# Patient Record
Sex: FEMALE | Race: WHITE | NOT HISPANIC OR LATINO | Employment: FULL TIME | URBAN - METROPOLITAN AREA
[De-identification: names, ages, dates, MRNs, and addresses within clinical notes are randomized per-mention and may not be internally consistent; named-entity substitution may affect disease eponyms.]

---

## 2023-03-20 ENCOUNTER — TELEPHONE (OUTPATIENT)
Dept: PREADMISSION TESTING | Facility: HOSPITAL | Age: 48
End: 2023-03-20
Payer: COMMERCIAL

## 2023-03-28 ENCOUNTER — HOSPITAL ENCOUNTER (OUTPATIENT)
Dept: PREADMISSION TESTING | Facility: HOSPITAL | Age: 48
Discharge: HOME | End: 2023-03-28
Attending: INTERNAL MEDICINE
Payer: COMMERCIAL

## 2023-03-28 DIAGNOSIS — Z12.11 SCREENING FOR COLON CANCER: Primary | ICD-10-CM

## 2023-03-28 RX ORDER — SODIUM, POTASSIUM,MAG SULFATES 17.5-3.13G
1 SOLUTION, RECONSTITUTED, ORAL ORAL DAILY
Qty: 1 KIT | Refills: 0 | Status: SHIPPED | OUTPATIENT
Start: 2023-03-28 | End: 2023-03-30

## 2023-04-14 ENCOUNTER — TELEPHONE (OUTPATIENT)
Dept: PREADMISSION TESTING | Facility: HOSPITAL | Age: 48
End: 2023-04-14
Payer: COMMERCIAL

## 2024-05-28 ENCOUNTER — TELEPHONE (OUTPATIENT)
Dept: NEUROLOGY | Facility: CLINIC | Age: 49
End: 2024-05-28
Payer: COMMERCIAL

## 2024-05-28 ENCOUNTER — OFFICE VISIT (OUTPATIENT)
Dept: NEUROLOGY | Facility: CLINIC | Age: 49
End: 2024-05-28
Payer: COMMERCIAL

## 2024-05-28 ENCOUNTER — TELEPHONE (OUTPATIENT)
Dept: OPHTHALMOLOGY | Facility: CLINIC | Age: 49
End: 2024-05-28
Payer: COMMERCIAL

## 2024-05-28 VITALS
SYSTOLIC BLOOD PRESSURE: 189 MMHG | WEIGHT: 142.19 LBS | BODY MASS INDEX: 24.28 KG/M2 | OXYGEN SATURATION: 99 % | RESPIRATION RATE: 16 BRPM | DIASTOLIC BLOOD PRESSURE: 98 MMHG | HEIGHT: 64 IN | HEART RATE: 65 BPM

## 2024-05-28 DIAGNOSIS — H53.8 BLURRED VISION, LEFT EYE: ICD-10-CM

## 2024-05-28 DIAGNOSIS — R40.20 LOSS OF CONSCIOUSNESS: Primary | ICD-10-CM

## 2024-05-28 PROCEDURE — 3008F BODY MASS INDEX DOCD: CPT | Mod: CPTII,S$GLB,, | Performed by: PSYCHIATRY & NEUROLOGY

## 2024-05-28 PROCEDURE — 99999 PR PBB SHADOW E&M-EST. PATIENT-LVL V: CPT | Mod: PBBFAC,,, | Performed by: PSYCHIATRY & NEUROLOGY

## 2024-05-28 PROCEDURE — 3080F DIAST BP >= 90 MM HG: CPT | Mod: CPTII,S$GLB,, | Performed by: PSYCHIATRY & NEUROLOGY

## 2024-05-28 PROCEDURE — 1159F MED LIST DOCD IN RCRD: CPT | Mod: CPTII,S$GLB,, | Performed by: PSYCHIATRY & NEUROLOGY

## 2024-05-28 PROCEDURE — 4010F ACE/ARB THERAPY RXD/TAKEN: CPT | Mod: CPTII,S$GLB,, | Performed by: PSYCHIATRY & NEUROLOGY

## 2024-05-28 PROCEDURE — 1160F RVW MEDS BY RX/DR IN RCRD: CPT | Mod: CPTII,S$GLB,, | Performed by: PSYCHIATRY & NEUROLOGY

## 2024-05-28 PROCEDURE — 3077F SYST BP >= 140 MM HG: CPT | Mod: CPTII,S$GLB,, | Performed by: PSYCHIATRY & NEUROLOGY

## 2024-05-28 PROCEDURE — 99205 OFFICE O/P NEW HI 60 MIN: CPT | Mod: S$GLB,,, | Performed by: PSYCHIATRY & NEUROLOGY

## 2024-05-28 RX ORDER — ATENOLOL 25 MG/1
25 TABLET ORAL
COMMUNITY
Start: 2024-03-19 | End: 2024-09-15

## 2024-05-28 RX ORDER — DEXTROAMPHETAMINE SACCHARATE, AMPHETAMINE ASPARTATE MONOHYDRATE, DEXTROAMPHETAMINE SULFATE AND AMPHETAMINE SULFATE 7.5; 7.5; 7.5; 7.5 MG/1; MG/1; MG/1; MG/1
CAPSULE, EXTENDED RELEASE ORAL EVERY MORNING
COMMUNITY

## 2024-05-28 RX ORDER — SERTRALINE HYDROCHLORIDE 50 MG/1
50 TABLET, FILM COATED ORAL EVERY MORNING
COMMUNITY

## 2024-05-28 RX ORDER — OLMESARTAN MEDOXOMIL 20 MG/1
1 TABLET ORAL EVERY MORNING
COMMUNITY
Start: 2024-03-19

## 2024-05-28 NOTE — PROGRESS NOTES
Subjective     Patient ID: Ayanna Olivo is a 49 y.o. female.    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 Pt had 2 witnessed syncopal episodes with diaphoresis while there from a seated position. Did not take BP medications this AM denies SOB/CP received 500NS bolus, reports feels slightly better CBG 88         Patient is a well-appearing 48-year-old female with a history of hypertension who is brought to the ED via ambulance due to syncope.  Patient states she was at a eye doctor appointment and had her eyes dilated was being told she may need an MRI of something in her eye when she began getting anxious and then felt like she was in a pass out.  She laid down on the ground but then went out for several minutes her mother reports.  She states she came back around but seemed confused and then passed out for another few seconds.  At this time patient's she just feels tired.  Patient mother states the patient did seem to shake a little bit when she went unconscious.  Patient does not appear to have had any postictal symptoms according to EMS.  No loss of bladder control.  Patient denies any headache or chest pain.  No shortness of breath.  Patient goes on reports she has had these issues off and on for several months now.  She  states it seems to happen whenever she is under emotional distress or anxious about something.  Patient goes on to report that she was at the eye doctor getting evaluated for a partial loss of vision to the lower visual field in the left eye.  This has happened slowly over the last several months.  She states she is feeling much better now following some IV fluids and route.           Vitals:     05/20/24 1501 05/20/24 1533 05/20/24 1600 05/20/24 1631   BP: (!) 186/99 (!) 163/91 (!) 168/83 (!) 151/80   BP Location: Left arm         Patient Position: Standing         Pulse: 72 63 62 64   Resp: 16 16 20 17   Temp:           TempSrc:           SpO2:   99% 99% 99%   Weight:           Height:                Medication List with Changes/Refills   Current Medications    ATENOLOL (TENORMIN) 25 MG TABLET    Take 25 mg by mouth.       Start Date: 3/19/2024 End Date: 9/15/2024             DEXTROAMPHETAMINE-AMPHETAMINE (ADDERALL XR) 30 MG 24 HR CAPSULE    Take by mouth every morning.       Start Date: --        End Date: --                      OLMESARTAN (BENICAR) 20 MG TABLET    Take 1 tablet by mouth every morning.       Start Date: 3/19/2024 End Date: --    SERTRALINE (ZOLOFT) 50 MG TABLET    Take 50 mg by mouth every morning.       Start Date: --        End Date: --   Discontinued Medications            Start Date: 9/22/2022 End Date: 5/28/2024    DEXTROAMPHETAMINE-AMPHETAMINE 10 MG TAB    Adderall Take No date recorded No form recorded No frequency recorded No route recorded No set duration recorded No set duration amount recorded active No dosage strength recorded No dosage strength units of measure recorded       Start Date: --        End Date: 5/28/2024     Smokes, vaping                 Review of Systems   Constitutional: Negative.    HENT: Negative.     Eyes:  Positive for visual disturbance.   Respiratory: Negative.     Cardiovascular: Negative.    Gastrointestinal: Negative.    Endocrine: Negative.     Genitourinary: Negative.    Musculoskeletal: Negative.    Integumentary:  Negative.   Allergic/Immunologic: Negative.    Neurological: Negative.    Hematological: Negative.    Psychiatric/Behavioral: Negative.            Objective     Physical Exam  Constitutional:       Appearance: Normal appearance.   HENT:      Head: Normocephalic and atraumatic.      Right Ear: External ear normal.      Left Ear: External ear normal.      Nose: Nose normal.      Mouth/Throat:      Mouth: Mucous membranes are moist.   Eyes:      Extraocular Movements: Extraocular movements intact.      Conjunctiva/sclera: Conjunctivae normal.      Pupils: Pupils are equal, round, and reactive to light.   Cardiovascular:      Rate and Rhythm: Normal rate.      Pulses: Normal pulses.   Pulmonary:      Effort: Pulmonary effort is normal.   Musculoskeletal:         General: Normal range of motion.   Skin:     General: Skin is warm.   Neurological:      General: No focal deficit present.      Mental Status: She is alert and oriented to person, place, and time.      Cranial Nerves: No cranial nerve deficit.      Sensory: No sensory deficit.      Motor: No weakness.   Psychiatric:         Mood and Affect: Mood normal.         Behavior: Behavior normal.         Thought Content: Thought content normal.         Judgment: Judgment normal.            Assessment and Plan   I reviewed MRI - unremarkable     AION vs ON (smoking)   1. Loss of consciousness  -     EEG,w/awake & asleep record; Future    2. Blurred vision, left eye  -     Ambulatory referral/consult to Ophthalmology; Future; Expected date: 06/04/2024      EEG   See cardiologist   Discussed management of vasovagal syncope   See counselor   See Neuro-ophthal      Patient education:   We discussed occupational risks and hazards, driving restrictions and limitations, and general safety in patients with epilepsy and patients with episodes of loss of consciousness from any etiology. I specifically  pointed out how the patient can put herself and others at serious risks (leading to death and/or injury) if she has a seizure (or episode of loss of consciousness)  while driving. Patient verbalized understanding. I requested that the patient meet with DMV to discuss protocol for driving privileges in patients with such disorders.          No follow-ups on file.

## 2024-05-28 NOTE — TELEPHONE ENCOUNTER
----- Message from Damion Pearson sent at 5/28/2024  4:04 PM CDT -----  Regarding: PT Advice  Contact: [phone]  Pt calling to speak with Dr. Yoo regarding Dr Davenport. States next available appt  is  Dec. Would like to be referred to someone else.  Please call pt back at  [phone]

## 2024-05-30 ENCOUNTER — PATIENT MESSAGE (OUTPATIENT)
Dept: NEUROLOGY | Facility: CLINIC | Age: 49
End: 2024-05-30
Payer: COMMERCIAL

## 2024-05-31 ENCOUNTER — PATIENT MESSAGE (OUTPATIENT)
Dept: NEUROLOGY | Facility: CLINIC | Age: 49
End: 2024-05-31
Payer: COMMERCIAL

## 2024-06-05 ENCOUNTER — HOSPITAL ENCOUNTER (OUTPATIENT)
Dept: PULMONOLOGY | Facility: HOSPITAL | Age: 49
Discharge: HOME | End: 2024-06-05
Payer: COMMERCIAL

## 2024-06-05 DIAGNOSIS — R40.20 LOSS OF CONSCIOUSNESS: ICD-10-CM

## 2024-06-05 PROCEDURE — 95816 EEG AWAKE AND DROWSY: CPT

## 2024-06-06 PROBLEM — R40.20 LOSS OF CONSCIOUSNESS: Status: ACTIVE | Noted: 2024-06-06

## 2024-06-11 ENCOUNTER — PATIENT MESSAGE (OUTPATIENT)
Dept: NEUROLOGY | Facility: CLINIC | Age: 49
End: 2024-06-11
Payer: COMMERCIAL

## 2024-06-11 ENCOUNTER — TELEPHONE (OUTPATIENT)
Dept: NEUROLOGY | Facility: CLINIC | Age: 49
End: 2024-06-11
Payer: COMMERCIAL

## 2024-06-11 NOTE — TELEPHONE ENCOUNTER
Called pt to schedule an apt to discuss results in   B.R with Fredo on 6/25 at 9:20 am. Pt also needs us to send over medical record to [facility] to see an neuropathologist.

## 2024-06-25 ENCOUNTER — OFFICE VISIT (OUTPATIENT)
Dept: NEUROLOGY | Facility: CLINIC | Age: 49
End: 2024-06-25
Payer: COMMERCIAL

## 2024-06-25 VITALS
HEIGHT: 64 IN | WEIGHT: 141.75 LBS | BODY MASS INDEX: 24.2 KG/M2 | DIASTOLIC BLOOD PRESSURE: 81 MMHG | SYSTOLIC BLOOD PRESSURE: 139 MMHG | HEART RATE: 59 BPM

## 2024-06-25 DIAGNOSIS — R40.20 LOSS OF CONSCIOUSNESS: Primary | ICD-10-CM

## 2024-06-25 DIAGNOSIS — H53.8 BLURRED VISION: ICD-10-CM

## 2024-06-25 PROCEDURE — 4010F ACE/ARB THERAPY RXD/TAKEN: CPT | Mod: CPTII,S$GLB,, | Performed by: PSYCHIATRY & NEUROLOGY

## 2024-06-25 PROCEDURE — 3008F BODY MASS INDEX DOCD: CPT | Mod: CPTII,S$GLB,, | Performed by: PSYCHIATRY & NEUROLOGY

## 2024-06-25 PROCEDURE — 3079F DIAST BP 80-89 MM HG: CPT | Mod: CPTII,S$GLB,, | Performed by: PSYCHIATRY & NEUROLOGY

## 2024-06-25 PROCEDURE — 1160F RVW MEDS BY RX/DR IN RCRD: CPT | Mod: CPTII,S$GLB,, | Performed by: PSYCHIATRY & NEUROLOGY

## 2024-06-25 PROCEDURE — 99999 PR PBB SHADOW E&M-EST. PATIENT-LVL IV: CPT | Mod: PBBFAC,,, | Performed by: PSYCHIATRY & NEUROLOGY

## 2024-06-25 PROCEDURE — 1159F MED LIST DOCD IN RCRD: CPT | Mod: CPTII,S$GLB,, | Performed by: PSYCHIATRY & NEUROLOGY

## 2024-06-25 PROCEDURE — 99214 OFFICE O/P EST MOD 30 MIN: CPT | Mod: S$GLB,,, | Performed by: PSYCHIATRY & NEUROLOGY

## 2024-06-25 PROCEDURE — 3075F SYST BP GE 130 - 139MM HG: CPT | Mod: CPTII,S$GLB,, | Performed by: PSYCHIATRY & NEUROLOGY

## 2024-06-25 NOTE — PROGRESS NOTES
"Patient ID: Ayanna Olivo is a 49 y.o. female.    Chief Complaint: Loss of Consciousness    History of Present Illness    Patient presents today for follow up on vision issues.    She reports blurry vision that fluctuates in severity, with some days appearing almost normal and other days with significant blurriness described as "looking through a fuzzy glass." She also endorses seeing floaters and "little squigglies" in her visual field. She denies pain in the left eye but reports recent onset of pain in the right eye that she attributes to overcompensating for the vision issues in the left eye. Driving and reading remain okay at this time.    She reports difficulty scheduling appointments with a neuro-ophthalmologist. She was scheduled to see Dr. Lancaster, but was informed he is not taking appointments until December. She attempted to schedule with his medical assistant Coby, but was told to find another provider herself. She then scheduled an appointment with Dr. Felipe Alfredo at [facility] in October, as he was the only available neuro-ophthalmologist she could find. However, October is still too far out. Discussed an alternative option of seeing Dr. Olena Mcpherson, a private practice neuro-ophthalmologist in [place] who may have earlier availability.    She presents for a cardiology follow-up visit and reports no loss of consciousness episodes. She is scheduled to see a cardiologist today after recovering from COVID-19. She had a previous cardiac workup done at the [facility], but the results are not available in the medical record system currently.    She underwent an EEG study at the [facility] for approximately 30 minutes duration with electrodes placed on her head. She is concerned that the technician may not have properly performed or documented the study, as the results are not appearing in the medical record system. She denies any other issues related to the EEG testing.    Her " anxiety and blood pressure medications were increased by Dr. Hendrickson from 50 mg to 100 mg each. She denies noticing any difference in how she feels on the increased dosages, but acknowledges her blood pressure has improved. No other medication changes reported.    She expresses interest in participating in a smoking cessation program, which she has not previously enrolled in. She acknowledges making progress in cutting back on smoking.          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 Pt had 2 witnessed syncopal episodes with diaphoresis while there from a seated position. Did not take BP medications this AM denies SOB/CP received 500NS bolus, reports feels slightly better CBG 88         Patient is a well-appearing 48-year-old female with a history of hypertension who is brought to the ED via ambulance due to syncope.  Patient states she was at a eye doctor appointment and had her eyes dilated was being told she may need an MRI of something in her eye when she began getting anxious and then felt like she was in a pass out.  She laid down on the ground but then went out for several minutes her mother reports.  She states she came back around but seemed confused and then passed out for another few seconds.  At this time patient's she just feels tired.  Patient mother states the patient  did seem to shake a little bit when she went unconscious.  Patient does not appear to have had any postictal symptoms according to EMS.  No loss of bladder control.  Patient denies any headache or chest pain.  No shortness of breath.  Patient goes on reports she has had these issues off and on for several months now.  She states it seems to happen whenever she is under emotional distress or anxious about something.  Patient goes on to report that she was at the eye doctor getting evaluated for a partial loss of vision to the lower visual field in the left eye.  This has happened slowly over the last several months.  She states she is feeling much better now following some IV fluids and route.           Vitals:     05/20/24 1501 05/20/24 1533 05/20/24 1600 05/20/24 1631   BP: (!) 186/99 (!) 163/91 (!) 168/83 (!) 151/80   BP Location: Left arm         Patient Position: Standing         Pulse: 72 63 62 64   Resp: 16 16 20 17   Temp:           TempSrc:           SpO2:   99% 99% 99%   Weight:           Height:                Medication List with Changes/Refills   Current Medications    ATENOLOL (TENORMIN) 25 MG TABLET    Take 25 mg by mouth.       Start Date: 3/19/2024 End Date: 9/15/2024             DEXTROAMPHETAMINE-AMPHETAMINE (ADDERALL XR) 30 MG 24 HR CAPSULE    Take by mouth every morning.       Start Date: --        End Date: --                      OLMESARTAN (BENICAR) 20 MG TABLET    Take 1 tablet by mouth every morning.       Start Date: 3/19/2024 End Date: --    SERTRALINE (ZOLOFT) 50 MG TABLET    Take 50 mg by mouth every morning.       Start Date: --        End Date: --   Discontinued Medications            Start Date: 9/22/2022 End Date: 5/28/2024    DEXTROAMPHETAMINE-AMPHETAMINE 10 MG TAB    Adderall Take No date recorded No form recorded No frequency recorded No route recorded No set duration recorded No set duration amount recorded active No dosage strength recorded No dosage strength units of measure  recorded       Start Date: --        End Date: 5/28/2024     Smokes, vaping                 Review of Systems   Constitutional: Negative.    HENT: Negative.     Eyes:  Positive for visual disturbance.   Respiratory: Negative.     Cardiovascular: Negative.    Gastrointestinal: Negative.    Endocrine: Negative.    Genitourinary: Negative.    Musculoskeletal: Negative.    Integumentary:  Negative.   Allergic/Immunologic: Negative.    Neurological: Negative.    Hematological: Negative.    Psychiatric/Behavioral: Negative.            Objective     Physical Exam  Constitutional:       Appearance: Normal appearance.   HENT:      Head: Normocephalic and atraumatic.      Right Ear: External ear normal.      Left Ear: External ear normal.      Nose: Nose normal.      Mouth/Throat:      Mouth: Mucous membranes are moist.   Eyes:      Extraocular Movements: Extraocular movements intact.      Conjunctiva/sclera: Conjunctivae normal.      Pupils: Pupils are equal, round, and reactive to light.   Cardiovascular:      Rate and Rhythm: Normal rate.      Pulses: Normal pulses.   Pulmonary:      Effort: Pulmonary effort is normal.   Musculoskeletal:         General: Normal range of motion.   Skin:     General: Skin is warm.   Neurological:      General: No focal deficit present.      Mental Status: She is alert and oriented to person, place, and time.      Cranial Nerves: No cranial nerve deficit.      Sensory: No sensory deficit.      Motor: No weakness.   Psychiatric:         Mood and Affect: Mood normal.         Behavior: Behavior normal.         Thought Content: Thought content normal.         Judgment: Judgment normal.          Assessment and Plan   I reviewed MRI - unremarkable     AION vs ON (smoking)   1. Loss of consciousness    2. Blurred vision  -     Ambulatory referral/consult to Smoking Cessation Program; Future; Expected date: 07/02/2024      Pending EEG   Pending cardiologist   Discussed management of vasovagal syncope    Pending Neuro-ophthal  Cut back smoking           Patient education:   We discussed occupational risks and hazards, driving restrictions and limitations, and general safety in patients with epilepsy and patients with episodes of loss of consciousness from any etiology. I specifically pointed out how the patient can put herself and others at serious risks (leading to death and/or injury) if she has a seizure (or episode of loss of consciousness)  while driving. Patient verbalized understanding. I requested that the patient meet with DMV to discuss protocol for driving privileges in patients with such disorders.          No follow-ups on file.

## 2024-06-26 ENCOUNTER — PATIENT MESSAGE (OUTPATIENT)
Dept: NEUROLOGY | Facility: CLINIC | Age: 49
End: 2024-06-26
Payer: COMMERCIAL

## 2024-06-27 ENCOUNTER — PATIENT MESSAGE (OUTPATIENT)
Dept: OPTOMETRY | Facility: CLINIC | Age: 49
End: 2024-06-27
Payer: COMMERCIAL

## 2024-07-22 ENCOUNTER — PATIENT MESSAGE (OUTPATIENT)
Dept: NEUROLOGY | Facility: CLINIC | Age: 49
End: 2024-07-22
Payer: COMMERCIAL

## 2024-08-01 ENCOUNTER — OFFICE VISIT (OUTPATIENT)
Dept: OPTOMETRY | Facility: CLINIC | Age: 49
End: 2024-08-01
Payer: COMMERCIAL

## 2024-08-01 DIAGNOSIS — H53.10 SUBJECTIVE VISION DISTURBANCE, LEFT EYE: Primary | ICD-10-CM

## 2024-08-01 DIAGNOSIS — H52.4 ASTIGMATISM OF BOTH EYES WITH PRESBYOPIA: ICD-10-CM

## 2024-08-01 DIAGNOSIS — H52.203 ASTIGMATISM OF BOTH EYES WITH PRESBYOPIA: ICD-10-CM

## 2024-08-01 PROCEDURE — 1160F RVW MEDS BY RX/DR IN RCRD: CPT | Mod: CPTII,S$GLB,, | Performed by: OPTOMETRIST

## 2024-08-01 PROCEDURE — 99999 PR PBB SHADOW E&M-EST. PATIENT-LVL II: CPT | Mod: PBBFAC,,, | Performed by: OPTOMETRIST

## 2024-08-01 PROCEDURE — 1159F MED LIST DOCD IN RCRD: CPT | Mod: CPTII,S$GLB,, | Performed by: OPTOMETRIST

## 2024-08-01 PROCEDURE — 4010F ACE/ARB THERAPY RXD/TAKEN: CPT | Mod: CPTII,S$GLB,, | Performed by: OPTOMETRIST

## 2024-08-01 PROCEDURE — 92133 CPTRZD OPH DX IMG PST SGM ON: CPT | Mod: S$GLB,,, | Performed by: OPTOMETRIST

## 2024-08-01 PROCEDURE — 92004 COMPRE OPH EXAM NEW PT 1/>: CPT | Mod: S$GLB,,, | Performed by: OPTOMETRIST

## 2024-08-01 NOTE — PROGRESS NOTES
"HPI    RASHIDA:05/2024  Last DFE: 05/2024  Chief complaint (CC): 49 year old F presents for annual eye exam. Pt c/o   blurry vision OS. Pt. Reports that in May, her vision in OS suddenly   became blurry and has progressively worsened. At time pt had double vision   in OS (side by side). Pt was seen by a retina specialist and sent her to   get a MRI to rule out tumor. MRI showed no signs of tumor.  Pt. is seeing neurology but they haven't given her a diagnosis. Pt denies   pain or any other ocular health complains today.  Glasses? OTC Readers +1.50  Contacts? -  H/o eye surgery, injections or laser: -  H/o eye injury: -  Known eye conditions? -  Family h/o eye conditions? -  Eye gtts? -      (-) Flashes (+ OD )  Floaters (-) Mucous   (-)  Tearing (-) Itching (-) Burning   (+) Headaches (-) Eye Pain/discomfort (-) Irritation   (-)  Redness (+ OS) Double vision (+ OS) Blurry vision    Diabetic? -  A1c? No results found for: "LABA1C", "HGBA1C"            Last edited by Swapnil Alba, OD on 8/1/2024  9:58 AM.            Assessment /Plan     For exam results, see Encounter Report.        Subjective vision disturbance, left eye   - Pt c/o blurry faded out vision OS   - Pt reports consistent low grade headache, occasional numbness in the legs/feet    - (-) tinnitus    - Pt had an MRI in May 2024- Pt does not have the results but does have the MRI disc. Pt reports that her doctor mentioned that there was possibly a legion on the left optic nerve.     - CT Head 5/20/24: The ventricles and cisterns appear normal. No parenchymal abnormalities, hemorrhage, mass effect or midline shift are seen. No acute infarcts are identified. No fracture.   - OCT RNFL today (8/1/24): OD WNL OS superior thinning observed on TSNIT curve and borderline IT thinning   - Color vision testing today OD 14/14 OS 10/14  - ONH appear perfused and distinct OU (-) pallor or edema  - RTC next available for VF 30-2 SS OU and f/u. Pt currently monitored by " Dr. Yoo, neurologist- will discuss with Dr. Renee to determine whether repeat MRI is necessary.      Astigmatism of both eyes with presbyopia   - New Spectacle Rx given, discussed different options for glasses.

## 2024-08-06 ENCOUNTER — CLINICAL SUPPORT (OUTPATIENT)
Dept: OPHTHALMOLOGY | Facility: CLINIC | Age: 49
End: 2024-08-06
Payer: COMMERCIAL

## 2024-08-06 NOTE — PROGRESS NOTES
HVF done ou./rel/fix/poor ou coop. Good ou./ chart checked for latex allergy./ plano + .75 x 101/od plano + .50 x 87/os-[facility]

## 2024-08-07 ENCOUNTER — OFFICE VISIT (OUTPATIENT)
Dept: OPTOMETRY | Facility: CLINIC | Age: 49
End: 2024-08-07
Payer: COMMERCIAL

## 2024-08-07 ENCOUNTER — PATIENT MESSAGE (OUTPATIENT)
Dept: OPTOMETRY | Facility: CLINIC | Age: 49
End: 2024-08-07

## 2024-08-07 DIAGNOSIS — H54.7 UNSPECIFIED VISUAL LOSS: ICD-10-CM

## 2024-08-07 DIAGNOSIS — H53.10 SUBJECTIVE VISION DISTURBANCE, LEFT EYE: Primary | ICD-10-CM

## 2024-08-07 PROCEDURE — 92012 INTRM OPH EXAM EST PATIENT: CPT | Mod: S$GLB,,, | Performed by: OPTOMETRIST

## 2024-08-07 PROCEDURE — 1160F RVW MEDS BY RX/DR IN RCRD: CPT | Mod: CPTII,S$GLB,, | Performed by: OPTOMETRIST

## 2024-08-07 PROCEDURE — 1159F MED LIST DOCD IN RCRD: CPT | Mod: CPTII,S$GLB,, | Performed by: OPTOMETRIST

## 2024-08-07 PROCEDURE — 99999 PR PBB SHADOW E&M-EST. PATIENT-LVL III: CPT | Mod: PBBFAC,,, | Performed by: OPTOMETRIST

## 2024-08-07 PROCEDURE — 92083 EXTENDED VISUAL FIELD XM: CPT | Mod: S$GLB,,, | Performed by: OPTOMETRIST

## 2024-08-07 PROCEDURE — 4010F ACE/ARB THERAPY RXD/TAKEN: CPT | Mod: CPTII,S$GLB,, | Performed by: OPTOMETRIST

## 2024-08-08 ENCOUNTER — PATIENT MESSAGE (OUTPATIENT)
Dept: OPTOMETRY | Facility: CLINIC | Age: 49
End: 2024-08-08
Payer: COMMERCIAL

## 2024-08-09 ENCOUNTER — TELEPHONE (OUTPATIENT)
Dept: OPHTHALMOLOGY | Facility: CLINIC | Age: 49
End: 2024-08-09
Payer: COMMERCIAL

## 2024-08-11 ENCOUNTER — HOSPITAL ENCOUNTER (OUTPATIENT)
Dept: RADIOLOGY | Facility: HOSPITAL | Age: 49
Discharge: HOME | End: 2024-08-11
Attending: OPTOMETRIST
Payer: COMMERCIAL

## 2024-08-11 DIAGNOSIS — H54.7 UNSPECIFIED VISUAL LOSS: ICD-10-CM

## 2024-08-11 PROCEDURE — A9585 GADOBUTROL INJECTION: HCPCS | Performed by: OPTOMETRIST

## 2024-08-11 PROCEDURE — 70553 MRI BRAIN STEM W/O & W/DYE: CPT

## 2024-08-11 PROCEDURE — 70543 MRI ORBT/FAC/NCK W/O &W/DYE: CPT | Mod: TC

## 2024-08-11 PROCEDURE — 25500020 PHARM REV CODE 255: Performed by: OPTOMETRIST

## 2024-08-11 PROCEDURE — 70543 MRI ORBT/FAC/NCK W/O &W/DYE: CPT | Mod: 26,,, | Performed by: RADIOLOGY

## 2024-08-11 PROCEDURE — 70553 MRI BRAIN STEM W/O & W/DYE: CPT | Mod: 26,,, | Performed by: RADIOLOGY

## 2024-08-11 RX ORDER — GADOBUTROL 604.72 MG/ML
7 INJECTION INTRAVENOUS
Status: COMPLETED | OUTPATIENT
Start: 2024-08-11 | End: 2024-08-11

## 2024-08-11 RX ADMIN — GADOBUTROL 7 ML: 604.72 INJECTION INTRAVENOUS at 03:08

## 2024-08-28 ENCOUNTER — OFFICE VISIT (OUTPATIENT)
Dept: OPTOMETRY | Facility: CLINIC | Age: 49
End: 2024-08-28
Payer: COMMERCIAL

## 2024-08-28 DIAGNOSIS — H54.7 UNSPECIFIED VISUAL LOSS: ICD-10-CM

## 2024-08-28 DIAGNOSIS — H53.10 SUBJECTIVE VISION DISTURBANCE, LEFT EYE: Primary | ICD-10-CM

## 2024-08-28 PROCEDURE — 1160F RVW MEDS BY RX/DR IN RCRD: CPT | Mod: CPTII,S$GLB,, | Performed by: OPTOMETRIST

## 2024-08-28 PROCEDURE — 99999 PR PBB SHADOW E&M-EST. PATIENT-LVL III: CPT | Mod: PBBFAC,,, | Performed by: OPTOMETRIST

## 2024-08-28 PROCEDURE — 4010F ACE/ARB THERAPY RXD/TAKEN: CPT | Mod: CPTII,S$GLB,, | Performed by: OPTOMETRIST

## 2024-08-28 PROCEDURE — 92134 CPTRZ OPH DX IMG PST SGM RTA: CPT | Mod: S$GLB,,, | Performed by: OPTOMETRIST

## 2024-08-28 PROCEDURE — 99214 OFFICE O/P EST MOD 30 MIN: CPT | Mod: S$GLB,,, | Performed by: OPTOMETRIST

## 2024-08-28 PROCEDURE — 1159F MED LIST DOCD IN RCRD: CPT | Mod: CPTII,S$GLB,, | Performed by: OPTOMETRIST

## 2024-08-28 NOTE — PROGRESS NOTES
HPI    DLS: 8/07/2024    Pt here for 2 week follow up;  Pt states vision is getting worse. Pt states she's been having headaches   lately. Pt states she does notice floaters, flashes but this is nothing   new. Pt states at times she would get eye pain behind her eyeball OS.     Meds;  No GTTS  Last edited by Swapnil Alba, OD on 8/28/2024 11:07 AM.            Assessment /Plan     For exam results, see Encounter Report.        Subjective vision disturbance, left eye  Unspecified visual loss              - Pt continues to c/o blurry faded out vision OS, now feels it is worsening with changes in color    - Pt reports consistent low grade headache, occasional numbness in the legs/feet               - (-) tinnitus               - Previous BCVA (8/7/24) OD 20/20 OS 20/25; today (8/28/24) BCVA OD 20/20 OS 20/40    - OCT macula today (8/28/24) Mild VMA OU; otherwise WNL with normal contour OU  - VF 30-2 SS OU (8/7/24) OD poor reliability secondary to high FL; scattered non-specific defects   OS poor reliability secondary to high FL; central scotoma               - MRI results discussed today   - MRI Head and Orbits (8/11/24) Impression:No advanced inflammatory changes abnormal optic nerve enhancement or compressive lesion involving the orbits.No acute intracranial process or enhancing intracranial lesion.   - Pt had a previous MRI in May 22, 2024: Findings:   The globes appear symmetric and normal in appearance.  The extraocular muscles are symmetric and unremarkable with normal enhancement.   Optic nerves appear symmetric in size and appearance with no sign of any abnormal signal   There is no sign of any orbital masses or acute inflammation   With post contrast imaging there is no sign of any abnormal enhancement inter about the orbital regions. Normal muscle enhancement seen.  Specifically, there is no sign of abnormal enhancement involving the optic nerves.              - CT Head 5/20/24: The ventricles and cisterns  appear normal. No parenchymal abnormalities, hemorrhage, mass effect or midline shift are seen. No acute infarcts are identified. No fracture.   - Last OCT RNFL  (8/1/24): OD WNL OS superior thinning observed on TSNIT curve and borderline IT thinning   - Color vision testing OD 14/14 OS 10/14  - ONH appear perfused and distinct OU (-) pallor or edema  - RTC as scheduled with Dr. Renee for further evaluation. Pt to see neuro-ophthalmologist in [place] for further evaluation in October 2024 as appointment with Dr. Renee is not until December 2024.

## 2024-09-18 ENCOUNTER — PATIENT MESSAGE (OUTPATIENT)
Dept: OPTOMETRY | Facility: CLINIC | Age: 49
End: 2024-09-18
Payer: COMMERCIAL

## 2024-10-24 ENCOUNTER — PATIENT MESSAGE (OUTPATIENT)
Dept: OPTOMETRY | Facility: CLINIC | Age: 49
End: 2024-10-24
Payer: COMMERCIAL